# Patient Record
Sex: MALE | Race: WHITE | NOT HISPANIC OR LATINO | Employment: FULL TIME | URBAN - METROPOLITAN AREA
[De-identification: names, ages, dates, MRNs, and addresses within clinical notes are randomized per-mention and may not be internally consistent; named-entity substitution may affect disease eponyms.]

---

## 2017-01-13 ENCOUNTER — TELEPHONE (OUTPATIENT)
Dept: FAMILY MEDICINE CLINIC | Facility: CLINIC | Age: 47
End: 2017-01-13

## 2017-01-13 DIAGNOSIS — I10 ESSENTIAL HYPERTENSION: ICD-10-CM

## 2017-01-13 RX ORDER — LISINOPRIL 10 MG/1
10 TABLET ORAL DAILY
Qty: 90 TABLET | Refills: 0 | Status: SHIPPED | OUTPATIENT
Start: 2017-01-13 | End: 2018-03-09

## 2017-01-13 NOTE — TELEPHONE ENCOUNTER
----- Message from Viri Lomas sent at 1/13/2017  1:35 PM EST -----  Contact: LARA  PATIENT IS OUT OF HIS MEDICATION, ASKING FOR A REFILL:    LISINOPRIL 10 MG    [facility]

## 2018-01-26 ENCOUNTER — TELEPHONE (OUTPATIENT)
Dept: FAMILY MEDICINE CLINIC | Facility: CLINIC | Age: 48
End: 2018-01-26

## 2018-01-26 RX ORDER — FAMCICLOVIR 500 MG/1
500 TABLET ORAL DAILY
Qty: 30 TABLET | Refills: 0 | Status: SHIPPED | OUTPATIENT
Start: 2018-01-26 | End: 2018-03-09 | Stop reason: SDUPTHER

## 2018-01-26 NOTE — TELEPHONE ENCOUNTER
----- Message from Viri Sawant sent at 1/26/2018  2:26 PM EST -----  Contact: JEREMYPT CALLED  REFILL ON famciclovir (FAMVIR) 500 MG tablet    [facility]    [phone]  MESSAGE OK

## 2018-01-26 NOTE — TELEPHONE ENCOUNTER
Please call.  I sent in a 30 day supply and he is due for office visit.  We have not seen him since July 2016.  Please make appointment.

## 2018-03-09 ENCOUNTER — OFFICE VISIT (OUTPATIENT)
Dept: FAMILY MEDICINE CLINIC | Facility: CLINIC | Age: 48
End: 2018-03-09

## 2018-03-09 VITALS
BODY MASS INDEX: 31.56 KG/M2 | SYSTOLIC BLOOD PRESSURE: 130 MMHG | DIASTOLIC BLOOD PRESSURE: 80 MMHG | TEMPERATURE: 99 F | WEIGHT: 233 LBS | HEIGHT: 72 IN | RESPIRATION RATE: 20 BRPM | HEART RATE: 92 BPM

## 2018-03-09 DIAGNOSIS — R05.9 COUGH: Primary | ICD-10-CM

## 2018-03-09 DIAGNOSIS — J20.8 ACUTE BRONCHITIS DUE TO OTHER SPECIFIED ORGANISMS: ICD-10-CM

## 2018-03-09 DIAGNOSIS — B00.9 HERPES SIMPLEX: ICD-10-CM

## 2018-03-09 LAB
EXPIRATION DATE: NORMAL
FLUAV AG NPH QL: NORMAL
FLUBV AG NPH QL: NORMAL
INTERNAL CONTROL: NORMAL
Lab: NORMAL

## 2018-03-09 PROCEDURE — 99213 OFFICE O/P EST LOW 20 MIN: CPT | Performed by: FAMILY MEDICINE

## 2018-03-09 PROCEDURE — 87804 INFLUENZA ASSAY W/OPTIC: CPT | Performed by: FAMILY MEDICINE

## 2018-03-09 RX ORDER — FAMCICLOVIR 500 MG/1
500 TABLET ORAL DAILY
Qty: 30 TABLET | Refills: 1 | Status: SHIPPED | OUTPATIENT
Start: 2018-03-09 | End: 2019-08-19 | Stop reason: SINTOL

## 2018-03-09 RX ORDER — AZITHROMYCIN 250 MG/1
TABLET, FILM COATED ORAL
Qty: 6 TABLET | Refills: 0 | Status: SHIPPED | OUTPATIENT
Start: 2018-03-09 | End: 2019-08-19

## 2018-03-09 RX ORDER — DEXTROMETHORPHAN HYDROBROMIDE AND PROMETHAZINE HYDROCHLORIDE 15; 6.25 MG/5ML; MG/5ML
5 SYRUP ORAL 4 TIMES DAILY PRN
Qty: 180 ML | Refills: 0 | Status: SHIPPED | OUTPATIENT
Start: 2018-03-09 | End: 2019-08-19

## 2018-03-09 NOTE — PATIENT INSTRUCTIONS
Go to the nearest ER or return to clinic if symptoms worsen, fever/chill develop      Acute Bronchitis, Adult  Acute bronchitis is when air tubes (bronchi) in the lungs suddenly get swollen. The condition can make it hard to breathe. It can also cause these symptoms:  · A cough.  · Coughing up clear, yellow, or green mucus.  · Wheezing.  · Chest congestion.  · Shortness of breath.  · A fever.  · Body aches.  · Chills.  · A sore throat.  Follow these instructions at home:  Medicines   · Take over-the-counter and prescription medicines only as told by your doctor.  · If you were prescribed an antibiotic medicine, take it as told by your doctor. Do not stop taking the antibiotic even if you start to feel better.  General instructions   · Rest.  · Drink enough fluids to keep your pee (urine) clear or pale yellow.  · Avoid smoking and secondhand smoke. If you smoke and you need help quitting, ask your doctor. Quitting will help your lungs heal faster.  · Use an inhaler, cool mist vaporizer, or humidifier as told by your doctor.  · Keep all follow-up visits as told by your doctor. This is important.  How is this prevented?  To lower your risk of getting this condition again:  · Wash your hands often with soap and water. If you cannot use soap and water, use hand .  · Avoid contact with people who have cold symptoms.  · Try not to touch your hands to your mouth, nose, or eyes.  · Make sure to get the flu shot every year.  Contact a doctor if:  · Your symptoms do not get better in 2 weeks.  Get help right away if:  · You cough up blood.  · You have chest pain.  · You have very bad shortness of breath.  · You become dehydrated.  · You faint (pass out) or keep feeling like you are going to pass out.  · You keep throwing up (vomiting).  · You have a very bad headache.  · Your fever or chills gets worse.  This information is not intended to replace advice given to you by your health care provider. Make sure you discuss  any questions you have with your health care provider.  Document Released: 06/05/2009 Document Revised: 07/26/2017 Document Reviewed: 06/07/2017  [employer] Interactive Patient Education © 2017 [employer]

## 2018-03-09 NOTE — PROGRESS NOTES
Subjective   Marquise Hyman is a 47 y.o. male.     URI    This is a new problem. The current episode started in the past 7 days. The problem has been gradually worsening. Maximum temperature: 99. The fever has been present for less than 1 day. Associated symptoms include congestion, coughing (productive), headaches and a sore throat. Pertinent negatives include no abdominal pain, diarrhea, dysuria, ear pain, nausea, plugged ear sensation, rhinorrhea, sinus pain, vomiting or wheezing. Treatments tried: Mucinex, Nyquil. The treatment provided no relief.        The following portions of the patient's history were reviewed and updated as appropriate: allergies, current medications, past family history, past medical history, past social history, past surgical history and problem list.    Review of Systems   Constitutional: Positive for fever. Negative for chills.   HENT: Positive for congestion and sore throat. Negative for ear pain, rhinorrhea and sinus pain.    Respiratory: Positive for cough (productive). Negative for shortness of breath and wheezing.    Gastrointestinal: Negative for abdominal pain, diarrhea, nausea and vomiting.   Genitourinary: Negative for dysuria.   Musculoskeletal: Negative for myalgias.   Neurological: Positive for headaches. Negative for dizziness and light-headedness.       Objective   Physical Exam   Constitutional: He is oriented to person, place, and time. He appears well-developed and well-nourished.   HENT:   Head: Normocephalic and atraumatic.   Right Ear: Hearing, tympanic membrane, external ear and ear canal normal.   Left Ear: Hearing, tympanic membrane, external ear and ear canal normal.   Nose: Nose normal.   Mouth/Throat: Oropharynx is clear and moist and mucous membranes are normal.   Eyes: Conjunctivae and EOM are normal.   Neck: Normal range of motion. Neck supple.   Cardiovascular: Normal rate, regular rhythm and normal heart sounds.    Pulmonary/Chest: Effort normal and  breath sounds normal. He has no wheezes.   Dry cough   Musculoskeletal: He exhibits no deformity.   Lymphadenopathy:     He has no cervical adenopathy.   Neurological: He is alert and oriented to person, place, and time.   Skin: Skin is warm and dry.   Psychiatric: His behavior is normal.   Nursing note and vitals reviewed.      Assessment/Plan   Problems Addressed this Visit     None      Visit Diagnoses     Cough    -  Primary    Relevant Medications    promethazine-dextromethorphan (PROMETHAZINE-DM) 6.25-15 MG/5ML syrup    Other Relevant Orders    POC Influenza A / B    Acute bronchitis due to other specified organisms        Relevant Medications    promethazine-dextromethorphan (PROMETHAZINE-DM) 6.25-15 MG/5ML syrup    azithromycin (ZITHROMAX) 250 MG tablet    Herpes simplex        Relevant Medications    famciclovir (FAMVIR) 500 MG tablet          Influenza negative   Cough syrup and Zpak to treat acute bronchitis. Ok to continue OTC Mucinex as directed.     Famvir refilled as courtesy to patient.

## 2019-08-19 ENCOUNTER — OFFICE VISIT (OUTPATIENT)
Dept: FAMILY MEDICINE CLINIC | Facility: CLINIC | Age: 49
End: 2019-08-19

## 2019-08-19 VITALS
SYSTOLIC BLOOD PRESSURE: 144 MMHG | BODY MASS INDEX: 31.08 KG/M2 | WEIGHT: 229.5 LBS | OXYGEN SATURATION: 97 % | TEMPERATURE: 98.3 F | RESPIRATION RATE: 18 BRPM | DIASTOLIC BLOOD PRESSURE: 84 MMHG | HEART RATE: 86 BPM | HEIGHT: 72 IN

## 2019-08-19 DIAGNOSIS — R42 DIZZINESS: ICD-10-CM

## 2019-08-19 DIAGNOSIS — G44.89 OTHER HEADACHE SYNDROME: Primary | ICD-10-CM

## 2019-08-19 DIAGNOSIS — I10 ESSENTIAL HYPERTENSION: ICD-10-CM

## 2019-08-19 PROCEDURE — 99214 OFFICE O/P EST MOD 30 MIN: CPT | Performed by: FAMILY MEDICINE

## 2019-08-19 RX ORDER — MECLIZINE HYDROCHLORIDE 25 MG/1
TABLET ORAL
Refills: 0 | COMMUNITY
Start: 2019-08-15

## 2019-08-19 RX ORDER — VALACYCLOVIR HYDROCHLORIDE 1 G/1
TABLET, FILM COATED ORAL
COMMUNITY
Start: 2019-04-12

## 2019-08-19 RX ORDER — FINASTERIDE 1 MG/1
1 TABLET, FILM COATED ORAL DAILY
Refills: 1 | COMMUNITY
Start: 2019-07-09

## 2019-08-19 RX ORDER — PREDNISONE 20 MG/1
TABLET ORAL
Refills: 0 | COMMUNITY
Start: 2019-08-15

## 2019-08-19 RX ORDER — LISINOPRIL 10 MG/1
10 TABLET ORAL DAILY
Refills: 1 | COMMUNITY
Start: 2019-06-25

## 2019-08-19 RX ORDER — FLUTICASONE PROPIONATE 50 MCG
SPRAY, SUSPENSION (ML) NASAL
Refills: 0 | COMMUNITY
Start: 2019-07-09

## 2019-08-19 NOTE — PROGRESS NOTES
Assessment/Plan       Problems Addressed this Visit        Cardiovascular and Mediastinum    Essential hypertension    Relevant Medications    lisinopril (PRINIVIL,ZESTRIL) 10 MG tablet      Other Visit Diagnoses     Other headache syndrome    -  Primary    Relevant Orders    MRI Brain Without Contrast    Dizziness        Relevant Orders    MRI Brain Without Contrast            Follow up: No Follow-up on file.     DISCUSSION  Persistent headache with dizziness.  Recommend check MRI of the brain.  He has a CT scan of the sinuses ordered today by physician from [facility].    Due to the dizziness, not able to work.  Hopefully can return to work on Wednesday, August 21, 2019.  Has been off since August 13, 2019.    Unable to work due to headache and intermittent dizziness.     Hypertension is stable on current dose of lisinopril 10 mg daily.      MEDICATIONS PRESCRIBED  Requested Prescriptions      No prescriptions requested or ordered in this encounter              -------------------------------------------    Subjective     Chief Complaint   Patient presents with   • Dizziness     ENT x6-8months ago. not sure if related   • Headache     8-9days         Dizziness   This is a new (monday evening til saturday. ) problem. The current episode started in the past 7 days. The problem occurs constantly. The problem has been gradually improving (with meds). Associated symptoms include headaches and nausea (with severe pain). Pertinent negatives include no vomiting or weakness. Associated symptoms comments: Was ok if still but worse when movement. Loss of balance. Dizziness would last 10-15 min. Pressure in ears. No sinus pain. . Exacerbated by: movement.   Headache    This is a recurrent (does not wake up every am with headache. mild headache now) problem. Episode onset: x 1 year. The problem occurs daily. Pain location: temple, across head to behind ears and to neck. The pain quality is not similar to prior headaches (has had  for 1 year off and on). The quality of the pain is described as aching. The pain is mild. Associated symptoms include blurred vision (at times  , comes and goes), dizziness, ear pain (pressure in the ears , ears pop some), a loss of balance, nausea (with severe pain) and tinnitus (some and sporadic). Pertinent negatives include no hearing loss, sinus pressure, vomiting or weakness. Exacerbated by: worse if shakes head. He has tried nothing for the symptoms. The treatment provided no relief. His past medical history is significant for migraine headaches (in the past and this is different).   Hypertension   This is a chronic problem. The current episode started more than 1 year ago. The problem is unchanged. The problem is controlled. Associated symptoms include blurred vision (at times  , comes and goes) and headaches. Pertinent negatives include no peripheral edema or shortness of breath. There are no associated agents to hypertension. Current antihypertension treatment includes ACE inhibitors. The current treatment provides moderate improvement. There are no compliance problems.  There is no history of angina, kidney disease or CAD/MI. There is no history of chronic renal disease.       Had episode of near syncope, was driving, 1 year ago.   Had headache at the time, no heart racing or palpitations        Persistent headaches  Has had for 1 week  2-3/10   No change , not better or worse    Was not able to work last week and saw Dr Isaacs 8/15 and gave steroid shot and prednisone.   Med has helped  Not as dizzy today but still has some when standing up or lighting changes,     Saw allergist and on flonase and meds and has helped with the sinus    Scheduled for CT scan at [facility] at 3:15 pm today    Has to apply for short term disability  Missed 8/13- 8/20/2019        Social History     Tobacco Use   Smoking Status Never Smoker   Smokeless Tobacco Never Used        Past Medical History,Medications, Allergies, and  "social history was reviewed.      Review of Systems   HENT: Positive for ear pain (pressure in the ears , ears pop some) and tinnitus (some and sporadic). Negative for hearing loss and sinus pressure.    Eyes: Positive for blurred vision (at times  , comes and goes).   Respiratory: Negative for shortness of breath.    Gastrointestinal: Positive for nausea (with severe pain). Negative for vomiting.   Neurological: Positive for dizziness and loss of balance. Negative for weakness.       Objective     Vitals:    08/19/19 1230   BP: 144/84   Pulse: 86   Resp: 18   Temp: 98.3 °F (36.8 °C)   TempSrc: Temporal   SpO2: 97%   Weight: 104 kg (229 lb 8 oz)   Height: 182.9 cm (72\")          Physical Exam   Constitutional: Vital signs are normal. He appears well-developed and well-nourished.   HENT:   Head: Normocephalic and atraumatic.   Right Ear: Hearing, tympanic membrane, external ear and ear canal normal.   Left Ear: Hearing, tympanic membrane, external ear and ear canal normal.   Mouth/Throat: Oropharynx is clear and moist.   Eyes: Conjunctivae, EOM and lids are normal. Pupils are equal, round, and reactive to light.   Neck: Normal range of motion. Neck supple. No thyromegaly present.   Cardiovascular: Normal rate, regular rhythm and normal heart sounds. Exam reveals no friction rub.   No murmur heard.  Pulmonary/Chest: Effort normal and breath sounds normal. No respiratory distress. He has no wheezes. He has no rales.   Abdominal: Soft. Normal appearance and bowel sounds are normal. He exhibits no distension and no mass. There is no tenderness. There is no rebound and no guarding.   Musculoskeletal: He exhibits no edema.   Neurological: He is alert. He has normal strength. He displays no atrophy and no tremor. No cranial nerve deficit. He exhibits normal muscle tone. He displays a negative Romberg sign. Coordination normal.   Reflex Scores:       Bicep reflexes are 2+ on the right side and 2+ on the left side.  Mild " swaying with Romberg   Skin: Skin is warm and dry.   Psychiatric: He has a normal mood and affect. His speech is normal. Cognition and memory are normal.   Nursing note and vitals reviewed.                Abilio Baron MD

## 2019-08-26 ENCOUNTER — HOSPITAL ENCOUNTER (OUTPATIENT)
Dept: MRI IMAGING | Facility: HOSPITAL | Age: 49
Discharge: HOME | End: 2019-08-26
Admitting: FAMILY MEDICINE

## 2019-08-26 DIAGNOSIS — G44.89 OTHER HEADACHE SYNDROME: ICD-10-CM

## 2019-08-26 DIAGNOSIS — R42 DIZZINESS: ICD-10-CM

## 2019-08-26 PROCEDURE — 70551 MRI BRAIN STEM W/O DYE: CPT

## 2019-08-29 DIAGNOSIS — R90.89 MRI OF BRAIN ABNORMAL: ICD-10-CM

## 2019-08-29 DIAGNOSIS — Q28.3 BRAIN VASCULAR MALFORMATION: Primary | ICD-10-CM

## 2019-09-24 ENCOUNTER — OFFICE VISIT (OUTPATIENT)
Dept: NEUROSURGERY | Facility: CLINIC | Age: 49
End: 2019-09-24

## 2019-09-24 VITALS
SYSTOLIC BLOOD PRESSURE: 120 MMHG | DIASTOLIC BLOOD PRESSURE: 86 MMHG | TEMPERATURE: 97.6 F | WEIGHT: 231.6 LBS | BODY MASS INDEX: 31.37 KG/M2 | HEIGHT: 72 IN

## 2019-09-24 DIAGNOSIS — R42 DIZZINESS: ICD-10-CM

## 2019-09-24 DIAGNOSIS — G44.209 MUSCLE CONTRACTION HEADACHE: Primary | ICD-10-CM

## 2019-09-24 PROCEDURE — 99203 OFFICE O/P NEW LOW 30 MIN: CPT | Performed by: NEUROLOGICAL SURGERY

## 2019-09-24 NOTE — PROGRESS NOTES
"Marquise Hyman  1970  [id number]      Chief Complaint   Patient presents with   • Headache   • Dizziness   • Blurred Vision       HISTORY OF PRESENT ILLNESS: This is a 49-year-old who presents with approximately 2-year history of increasing episodes which she describes as headaches, dizziness, pain and pressure in his ears and blurred vision.  Any movement tends to exacerbate his symptoms.  He has no other symptoms associated with this in the context of unilateral weakness or sensory loss.  MRI of the brain was performed is referred for neurosurgical consultation.  He has noted that he has some \"tightness\" in his neck.    Past Medical History:   Diagnosis Date   • Herpes simplex    • Lumbar strain        Past Surgical History:   Procedure Laterality Date   • HERNIA REPAIR         Family History   Problem Relation Age of Onset   • Heart disease Mother         congenital   • Diabetes Mother    • Heart disease Father         congenital   • Diabetes Father        Social History     Socioeconomic History   • Marital status: Legally      Spouse name: Not on file   • Number of children: Not on file   • Years of education: Not on file   • Highest education level: Not on file   Tobacco Use   • Smoking status: Never Smoker   • Smokeless tobacco: Never Used   Substance and Sexual Activity   • Alcohol use: Yes     Comment: occasional   • Drug use: No   • Sexual activity: Defer       No Known Allergies      Current Outpatient Medications:   •  finasteride (PROPECIA) 1 MG tablet, Take 1 mg by mouth Daily., Disp: , Rfl: 1  •  fluticasone (FLONASE) 50 MCG/ACT nasal spray, USE 1 SPRAY(S) IN EACH NOSTRIL TWICE DAILY, Disp: , Rfl: 0  •  lisinopril (PRINIVIL,ZESTRIL) 10 MG tablet, Take 10 mg by mouth Daily., Disp: , Rfl: 1  •  meclizine (ANTIVERT) 25 MG tablet, TAKE 1 TABLET BY MOUTH THREE TIMES DAILY IF NEEDED FOR DIZZINESS FOR UP TO 10 DAYS, Disp: , Rfl: 0  •  predniSONE (DELTASONE) 20 MG tablet, TAKE 3 TABLETS BY " "MOUTH ONCE DAILY FOR 3 DAYS THEN TAKE 2 TABS ONCE DAILY FOR 3 DAYS THEN TAKE 1 TAB ONCE DAILY FOR 3 DAYS, Disp: , Rfl: 0  •  valACYclovir (VALTREX) 1000 MG tablet, Take 2 pills at first sign of fever blister. Repeat in 12 hours. Use remainder for future outbreaks., Disp: , Rfl:     Review of Systems   Constitutional: Positive for fatigue.   HENT: Positive for dental problem (occassional ), ear pain, hearing loss, tinnitus and trouble swallowing (occasional).    Eyes: Positive for photophobia and visual disturbance.   Genitourinary: Positive for urgency.   Musculoskeletal: Positive for arthralgias, back pain, myalgias, neck pain and neck stiffness.   Allergic/Immunologic: Positive for environmental allergies.   Neurological: Positive for dizziness, syncope (occassional-nearly fainting), speech difficulty (occassional), weakness, light-headedness and headaches.   Psychiatric/Behavioral: Positive for agitation, decreased concentration and sleep disturbance. The patient is nervous/anxious.        Vitals:    09/24/19 1536   BP: 120/86   BP Location: Left arm   Patient Position: Sitting   Cuff Size: Adult   Temp: 97.6 °F (36.4 °C)   TempSrc: Temporal   Weight: 105 kg (231 lb 9.6 oz)   Height: 182.9 cm (72\")       Neurological Examination:    Mental status/speech: The patient is alert and oriented.  Speech is clear without aphysia or dysarthria.  No overt cognitive deficits.    Cranial nerve examination:    Olfaction: Smell is intact.  Vision: Vision is intact without visual field abnormalities.  Funduscopic examination is normal.  No pupillary irregularity.  Ocular motor examination: The extraocular muscles are intact.  There is no diplopia.  The pupil is round and reactive to both light and accommodation.  There is no nystagmus.  Facial movement/sensation: There is no facial weakness.  Sensation is intact in the first, second, and third divisions of the trigeminal nerve.  The corneal reflex is intact.  Auditory: Hearing " is intact to finger rub bilaterally.  Cranial nerves IX, X, XI, XII: Phonation is normal.  No dysphagia.  Tongue is protruded in the midline without atrophy.  The gag reflex is intact.  Shoulder shrug is normal.    Musculoligamentous ligamentous examination: Slight decreased range of motion of cervical spine.  No weakness, sensory loss or reflex asymmetry.  His gait is normal.  No Babinski James or clonus.          Medical Decision Making:     Diagnostic Data Set: MRI of the brain is essentially within normal limits.      Assessment: Muscle contraction headache          Recommendations: I have ordered a cervical spine MRI, EEG and will ask him to return to see me on the same day.  The etiology of headaches remains somewhat obscure an undisclosed.  These headaches may well be related to stress as well.        I greatly appreciate the opportunity to see and evaluate this individual.  If you have questions or concerns regarding issues that I may have overlooked please call me at any time: [phone].  Giuseppe Castaneda M.D.  [facility]  [address] [place], [place] [address] .  [place] [phone]

## 2019-10-01 DIAGNOSIS — R42 DIZZINESS: Primary | ICD-10-CM

## 2019-10-01 DIAGNOSIS — G44.209 MUSCLE CONTRACTION HEADACHE: ICD-10-CM

## 2019-10-03 ENCOUNTER — APPOINTMENT (OUTPATIENT)
Dept: MRI IMAGING | Facility: HOSPITAL | Age: 49
End: 2019-10-03

## 2019-10-07 ENCOUNTER — HOSPITAL ENCOUNTER (OUTPATIENT)
Dept: PHYSICAL THERAPY | Facility: HOSPITAL | Age: 49
Setting detail: THERAPIES SERIES
Discharge: HOME | End: 2019-10-07

## 2019-10-07 ENCOUNTER — HOSPITAL ENCOUNTER (OUTPATIENT)
Dept: NEUROLOGY | Facility: HOSPITAL | Age: 49
Discharge: HOME | End: 2019-10-07
Admitting: NEUROLOGICAL SURGERY

## 2019-10-07 DIAGNOSIS — R51.9 CHRONIC NONINTRACTABLE HEADACHE, UNSPECIFIED HEADACHE TYPE: ICD-10-CM

## 2019-10-07 DIAGNOSIS — G44.209 MUSCLE CONTRACTION HEADACHE: ICD-10-CM

## 2019-10-07 DIAGNOSIS — R42 DIZZINESS: Primary | ICD-10-CM

## 2019-10-07 DIAGNOSIS — R42 DIZZINESS: ICD-10-CM

## 2019-10-07 DIAGNOSIS — G89.29 CHRONIC NONINTRACTABLE HEADACHE, UNSPECIFIED HEADACHE TYPE: ICD-10-CM

## 2019-10-07 PROCEDURE — 97161 PT EVAL LOW COMPLEX 20 MIN: CPT

## 2019-10-07 PROCEDURE — 95819 EEG AWAKE AND ASLEEP: CPT

## 2019-10-07 NOTE — THERAPY DISCHARGE NOTE
Outpatient Physical Therapy Vestibular Initial Evaluation/Discharge  [facility]     Patient Name: Marquise Hyman  : 1970  MRN: [id number]  Today's Date: 10/7/2019      Visit Date: 10/07/2019    Patient Active Problem List   Diagnosis   • Essential hypertension   • Fatigue   • Vitamin D deficiency   • Health care maintenance     Past Medical History:   Diagnosis Date   • Herpes simplex    • Lumbar strain      Past Surgical History:   Procedure Laterality Date   • HERNIA REPAIR     Visit Dx:     ICD-10-CM ICD-9-CM   1. Dizziness R42 780.4   2. Chronic nonintractable headache, unspecified headache type R51 784.0     Patient History     Row Name 10/07/19 0730             History    Chief Complaint  Difficulty with daily activities;Headache;Dizziness  -MM      Date Current Problem(s) Began  19  -MM      Brief Description of Current Complaint  Client presents with constant headache and occaisional dizziness with fast head movement. He had a severe bout of vertigo 19 and symptoms lasted 4-5 days. He only notices dizziness now if he makes sudden movements or bends over a lot. He had some dizziness a few days ago when bending over and playing pool. His headache has been constant and usually mild. Symptoms improve some with over the counter ibuprofen, but don't every go away completely. He reports a frequent sensation of pressure behind his ears. He reports that his ears also pop a lot. No changes in hearing and no tinnitus.  -MM      Patient/Caregiver Goals  Relieve pain;Relief from dizziness  -MM         Pain     Pain Location  Head  -MM      Pain at Present  2  -MM      Pain at Best  1  -MM      Pain at Worst  4  -MM      Pain Frequency  Constant/continuous  -MM      Pain Description  Aching  -MM      Pain Comments  headache posterior to the ears and around to the jaw bilateral.   -MM      Difficulties with ADL's?  sudden movements and forward bending  -MM         Fall Risk Assessment    Any  falls in the past year:  No  -MM         Daily Activities    Primary Language  English  -MM      Barriers to learning  None  -MM      Pt Participated in POC and Goals  Yes  -MM        User Key  (r) = Recorded By, (t) = Taken By, (c) = Cosigned By    Initials Name Provider Type    Per Geronimo PT Physical Therapist        Vestibular Trung     Row Name 10/07/19 0815             Pain Assessment    Pain Assessment  -- mild constant headache  -MM         Vestibular Objective    Fall History  no  -MM      Use of Assistive Devices  none  -MM         Symptom Behavior    Type of Dizziness  Blurred Vision;Other occaisional dizziness with fast head movement/bending  -MM      Frequency of Dizziness  Weekly  -MM      Duration of Dizziness  Seconds  -MM      VAS Intensity (0-10)  3  -MM      Aggravating Factors  Forward bending;Turning head quickly  -MM      Relieving Factors  Rest;Slow movements;Medication  -MM         Occulomotor Exam Fixation Present    Occular ROM  Normal  -MM      Spontaneous Nystagmus  Absent  -MM      Gaze-induced Nystagmus  Absent  -MM      Smooth Pursuit  Normal  -MM      Saccades  Intact  -MM         Vestibulo-Occular Reflex (VOR)    VOR to Fast Head Movement/Head Thrust Test  Normal  -MM         Positional Testing    Olesya-Hallpike Right  No nystagmus  -MM      Olesya-Hallpike Left  No nystagmus  -MM      Horizontal Roll Test Right  No nystagmus  -MM      Horizontal Roll Test Left  No nystagmus  -MM         General ROM    GENERAL ROM COMMENTS  neck ROM is normal. No increase or change in pain or dizziness with prolonged neck extension or rotation either direction.  -MM         High-level Balance    High-level Balance Other  balance on foam EC: normal after 2 practice sets. standing turns: normal. walking and locating targets: normal.   -MM        User Key  (r) = Recorded By, (t) = Taken By, (c) = Cosigned By    Initials Name Provider Type    Per Geronimo PT Physical Therapist        PT OP  Goals     Row Name 10/07/19 0815          PT Short Term Goals    STG Date to Achieve  10/14/19  -MM     STG 1  Client will demonstrate good understanding of testing for vestibular dysfunction.  -MM     STG 1 Progress  Met  -MM        Time Calculation    PT Goal Re-Cert Due Date  01/05/20  -MM       User Key  (r) = Recorded By, (t) = Taken By, (c) = Cosigned By    Initials Name Provider Type    Per Geronimo, PT Physical Therapist        PT Assessment/Plan     Row Name 10/07/19 0815          PT Assessment    Functional Limitations  Limitation in home management  -MM     Impairments  Pain  -MM     Assessment Comments  Client was evaluated today for headaches and dizziness. Symptoms suggest possible vestibular component, but extensive testing for vestibular system was all normal. He demonstrates normal balance and coordination. He has very good neck ROM without increase in pain or dizziness. He has a constant headache with pressure sensation. The cause of the headache is uncertain. No further PT is warranted at this time. There isn't any indication that vestibular rehab or cervical spine rehab would be of benefit at this time.  -MM     Please refer to paper survey for additional self-reported information  Yes  -MM     Rehab Potential  Fair  -MM     Patient/caregiver participated in establishment of treatment plan and goals  Yes  -MM     Patient would benefit from skilled therapy intervention  Yes  -MM        PT Plan    PT Frequency  One time visit  -MM     Planned CPT's?  PT EVAL LOW COMPLEXITY: 66410  -MM     PT Plan Comments  No further PT indicated at this time. Client is following up with his physician.  -MM       User Key  (r) = Recorded By, (t) = Taken By, (c) = Cosigned By    Initials Name Provider Type    Per Geronimo, PT Physical Therapist       Time Calculation:   Start Time: 0815   Therapy Charges for Today     Code Description Service Date Service Provider Modifiers Qty    [id number]  PT  EVAL LOW COMPLEXITY 3 10/7/2019 Per Zamarripa, PT GP 1      Per Zamarripa, PT  10/7/2019

## 2019-10-14 ENCOUNTER — APPOINTMENT (OUTPATIENT)
Dept: MRI IMAGING | Facility: HOSPITAL | Age: 49
End: 2019-10-14

## 2019-10-22 ENCOUNTER — OFFICE VISIT (OUTPATIENT)
Dept: NEUROSURGERY | Facility: CLINIC | Age: 49
End: 2019-10-22

## 2019-10-22 ENCOUNTER — HOSPITAL ENCOUNTER (OUTPATIENT)
Dept: MRI IMAGING | Facility: HOSPITAL | Age: 49
Discharge: HOME | End: 2019-10-22
Admitting: NEUROLOGICAL SURGERY

## 2019-10-22 VITALS
WEIGHT: 225 LBS | TEMPERATURE: 97.3 F | HEIGHT: 72 IN | SYSTOLIC BLOOD PRESSURE: 118 MMHG | BODY MASS INDEX: 30.48 KG/M2 | DIASTOLIC BLOOD PRESSURE: 76 MMHG

## 2019-10-22 DIAGNOSIS — R42 DIZZINESS: ICD-10-CM

## 2019-10-22 DIAGNOSIS — G44.209 MUSCLE CONTRACTION HEADACHE: ICD-10-CM

## 2019-10-22 DIAGNOSIS — R42 DIZZINESS: Primary | ICD-10-CM

## 2019-10-22 PROCEDURE — 99213 OFFICE O/P EST LOW 20 MIN: CPT | Performed by: NEUROLOGICAL SURGERY

## 2019-10-22 PROCEDURE — 72141 MRI NECK SPINE W/O DYE: CPT

## 2019-10-22 RX ORDER — METHOCARBAMOL 750 MG/1
750 TABLET, FILM COATED ORAL NIGHTLY
Qty: 14 TABLET | Refills: 0 | Status: SHIPPED | OUTPATIENT
Start: 2019-10-22 | End: 2019-11-14 | Stop reason: SDUPTHER

## 2019-10-22 RX ORDER — CELECOXIB 200 MG/1
200 CAPSULE ORAL 2 TIMES DAILY
Qty: 28 CAPSULE | Refills: 0 | Status: SHIPPED | OUTPATIENT
Start: 2019-10-22 | End: 2019-11-14 | Stop reason: SDUPTHER

## 2019-10-22 NOTE — PROGRESS NOTES
Marquise Hyman  1970  [id number]                        CHIEF COMPLAINT: Neck pain and headache         MEDICAL HISTORY SINCE LAST ENCOUNTER: He continues to have headaches and neck pain for which I have been unable to find a precise etiology.  His vertigo has not been helped with physical therapy.  He noted that when he carried a package about his neck he has headaches and neck pain were worse.  He reports today for follow-up studies.           Past Medical History:   Diagnosis Date   • Herpes simplex    • Lumbar strain               Past Surgical History:   Procedure Laterality Date   • HERNIA REPAIR                Family History   Problem Relation Age of Onset   • Heart disease Mother         congenital   • Diabetes Mother    • Heart disease Father         congenital   • Diabetes Father               Social History     Socioeconomic History   • Marital status: Legally      Spouse name: Not on file   • Number of children: Not on file   • Years of education: Not on file   • Highest education level: Not on file   Tobacco Use   • Smoking status: Never Smoker   • Smokeless tobacco: Never Used   Substance and Sexual Activity   • Alcohol use: Yes     Comment: occasional   • Drug use: No   • Sexual activity: Defer            No Known Allergies           Current Outpatient Medications:   •  finasteride (PROPECIA) 1 MG tablet, Take 1 mg by mouth Daily., Disp: , Rfl: 1  •  fluticasone (FLONASE) 50 MCG/ACT nasal spray, USE 1 SPRAY(S) IN EACH NOSTRIL TWICE DAILY, Disp: , Rfl: 0  •  lisinopril (PRINIVIL,ZESTRIL) 10 MG tablet, Take 10 mg by mouth Daily., Disp: , Rfl: 1  •  meclizine (ANTIVERT) 25 MG tablet, TAKE 1 TABLET BY MOUTH THREE TIMES DAILY IF NEEDED FOR DIZZINESS FOR UP TO 10 DAYS, Disp: , Rfl: 0  •  predniSONE (DELTASONE) 20 MG tablet, TAKE 3 TABLETS BY MOUTH ONCE DAILY FOR 3 DAYS THEN TAKE 2 TABS ONCE DAILY FOR 3 DAYS THEN TAKE 1 TAB ONCE DAILY FOR 3 DAYS, Disp: , Rfl: 0  •  valACYclovir (VALTREX)  1000 MG tablet, Take 2 pills at first sign of fever blister. Repeat in 12 hours. Use remainder for future outbreaks., Disp: , Rfl:          Review of Systems   Constitutional: Positive for fatigue. Negative for activity change, appetite change, chills, diaphoresis, fever and unexpected weight change.   HENT: Positive for dental problem (occassional ), ear pain, hearing loss, tinnitus and trouble swallowing (occasional). Negative for congestion, drooling, ear discharge, facial swelling, mouth sores, nosebleeds, postnasal drip, rhinorrhea, sinus pressure, sneezing, sore throat and voice change.    Eyes: Positive for photophobia and visual disturbance. Negative for pain, discharge, redness and itching.   Respiratory: Negative for apnea, cough, choking, chest tightness, shortness of breath, wheezing and stridor.    Cardiovascular: Negative for chest pain, palpitations and leg swelling.   Gastrointestinal: Negative for abdominal distention, abdominal pain, anal bleeding, blood in stool, constipation, diarrhea, nausea, rectal pain and vomiting.   Endocrine: Negative for cold intolerance, heat intolerance, polydipsia, polyphagia and polyuria.   Genitourinary: Positive for urgency. Negative for decreased urine volume, difficulty urinating, dysuria, enuresis, flank pain, frequency, genital sores and hematuria.   Musculoskeletal: Positive for arthralgias, back pain, myalgias, neck pain and neck stiffness. Negative for gait problem and joint swelling.   Skin: Negative for color change, pallor, rash and wound.   Allergic/Immunologic: Positive for environmental allergies. Negative for food allergies and immunocompromised state.   Neurological: Positive for dizziness, syncope (occassional-nearly fainting), speech difficulty (occassional), weakness, light-headedness and headaches. Negative for tremors, seizures, facial asymmetry and numbness.   Hematological: Negative for adenopathy. Does not bruise/bleed easily.  "  Psychiatric/Behavioral: Positive for agitation, decreased concentration and sleep disturbance. Negative for behavioral problems, confusion, dysphoric mood, hallucinations, self-injury and suicidal ideas. The patient is nervous/anxious. The patient is not hyperactive.                Vitals:    10/22/19 1410   Weight: 102 kg (225 lb)   Height: 182.9 cm (72\")               EXAMINATION: Slight decreased range of motion of the neck.  No weakness, sensory loss or reflex asymmetry otherwise.  Gait normal.            MEDICAL DECISION MAKING: EEG is normal.  Cervical MRI shows mild degenerative change but no evidence of significant compromise of the neuroforamen or canal.           ASSESSMENT/DISPOSITION: I do not know of anything to offer him from a surgical point of view.  I am unable to find any abnormality that would suggest surgery would be a consideration.  I have given him prescription of Celebrex 200 mg twice daily Robaxin-750 milligrams at night and will ask him to call me in 2 weeks.  If this protocol is not help would recommend neurology evaluation.              I APPRECIATE THE OPPORTUNITY OF THIS REFERRAL. PLEASE CALL IF ANY       QUESTIONS [phone]    "

## 2019-10-22 NOTE — PATIENT INSTRUCTIONS
Call Dr. Castaneda on a Monday or Tuesday with an update.      Ask for Socorro () and leave a message for  Dr. Castaneda.     He will call you back at the end of the day as soon as he can.     [phone]    Call 2 weeks

## 2019-11-14 ENCOUNTER — PATIENT MESSAGE (OUTPATIENT)
Dept: NEUROSURGERY | Facility: CLINIC | Age: 49
End: 2019-11-14

## 2019-11-14 DIAGNOSIS — G44.209 MUSCLE CONTRACTION HEADACHE: Primary | ICD-10-CM

## 2019-11-14 DIAGNOSIS — R51.9 ACUTE INTRACTABLE HEADACHE, UNSPECIFIED HEADACHE TYPE: Primary | ICD-10-CM

## 2019-11-14 RX ORDER — METHOCARBAMOL 750 MG/1
750 TABLET, FILM COATED ORAL NIGHTLY
Qty: 14 TABLET | Refills: 0 | Status: SHIPPED | OUTPATIENT
Start: 2019-11-14 | End: 2019-11-28

## 2019-11-14 RX ORDER — CELECOXIB 200 MG/1
200 CAPSULE ORAL 2 TIMES DAILY
Qty: 28 CAPSULE | Refills: 0 | Status: SHIPPED | OUTPATIENT
Start: 2019-11-14 | End: 2019-11-28

## 2019-11-15 ENCOUNTER — TELEPHONE (OUTPATIENT)
Dept: NEUROSURGERY | Facility: CLINIC | Age: 49
End: 2019-11-15

## 2019-11-15 NOTE — TELEPHONE ENCOUNTER
Regarding: Prescription Question  Contact: [phone]  ----- Message from [employer], Generic sent at 11/14/2019  2:46 PM EST -----    Hemarielos Castaneda,   The meds have really helped my neck pain and tension.    Headaches are very minimal at this point.   Still have some days but not constant like before.   Could I get refills?  Thank you,  Marquise Hyman

## 2019-12-04 RX ORDER — CELECOXIB 200 MG/1
200 CAPSULE ORAL DAILY
Qty: 60 CAPSULE | Refills: 0 | Status: SHIPPED | OUTPATIENT
Start: 2019-12-04 | End: 2020-10-27 | Stop reason: SDUPTHER

## 2019-12-04 NOTE — TELEPHONE ENCOUNTER
Provider:  Leonel  Caller:  Automated refill request  Surgery:  NA  Surgery Date:    Last visit:  10/22/19  Next visit: NA    Reason for call:         Requested Prescriptions     Pending Prescriptions Disp Refills   • celecoxib (CeleBREX) 200 MG capsule 60 capsule 0     Sig: Take 1 capsule by mouth Daily.     Regarding: Prescription Question  Contact: [phone]  ----- Message from [employer] sent at 12/4/2019  5:52 AM EST -----    Can I please get a 30 day or 90 day prescription refill of the Celebrex next?    These 14 day ones are too frequent.

## 2020-01-21 RX ORDER — CELECOXIB 200 MG/1
200 CAPSULE ORAL 2 TIMES DAILY
Qty: 60 CAPSULE | Refills: 0 | Status: SHIPPED | OUTPATIENT
Start: 2020-01-21 | End: 2020-03-28 | Stop reason: SDUPTHER

## 2020-01-21 NOTE — TELEPHONE ENCOUNTER
Provider: Leonel   Caller: Pt (My chart message)  Time of call:   6:49am  Phone #:  [phone]  Surgery:  n/a  Surgery Date: n/a    Last visit: 10/22/19    Next visit: PRN       Reason for call:       Pt requesting rf on Celebrex.

## 2020-01-21 NOTE — TELEPHONE ENCOUNTER
----- Message from Marquise Hyman sent at 1/21/2020  6:49 AM EST -----  Regarding: Prescription Question  Contact: [phone]  Hello.   I just realized my prescription for Celebrex runs out tomorrow.   Could I get a refill?   It seems to be working well with the neck pain and tension, which have reduced the headaches significantly.   Thank you.

## 2020-03-30 RX ORDER — CELECOXIB 200 MG/1
200 CAPSULE ORAL 2 TIMES DAILY
Qty: 60 CAPSULE | Refills: 0 | Status: SHIPPED | OUTPATIENT
Start: 2020-03-30 | End: 2020-06-17 | Stop reason: SDUPTHER

## 2020-03-30 NOTE — TELEPHONE ENCOUNTER
Provider: Leonel   [facility]  Surgery:  n/a  Surgery Date: n/a    Last visit: 10/22/19    Next visit: PRN       Pt requests refill of Celebrex, med pended.

## 2020-06-18 RX ORDER — CELECOXIB 200 MG/1
200 CAPSULE ORAL 2 TIMES DAILY
Qty: 60 CAPSULE | Refills: 0 | Status: SHIPPED | OUTPATIENT
Start: 2020-06-18 | End: 2020-08-16 | Stop reason: SDUPTHER

## 2020-06-18 NOTE — TELEPHONE ENCOUNTER
Provider:  Leonel  Caller:  Automated refill request  Surgery:  NA  Surgery Date:    Last visit:  10/22/20  Next visit: NA    Reason for call:         Requested Prescriptions     Pending Prescriptions Disp Refills   • celecoxib (CeleBREX) 200 MG capsule 60 capsule 0     Sig: Take 1 capsule by mouth 2 (Two) Times a Day.

## 2020-08-17 RX ORDER — CELECOXIB 200 MG/1
200 CAPSULE ORAL 2 TIMES DAILY
Qty: 60 CAPSULE | Refills: 0 | Status: SHIPPED | OUTPATIENT
Start: 2020-08-17

## 2020-08-17 RX ORDER — CELECOXIB 200 MG/1
200 CAPSULE ORAL DAILY
Qty: 60 CAPSULE | Refills: 0 | OUTPATIENT
Start: 2020-08-17

## 2020-10-27 DIAGNOSIS — G44.209 MUSCLE CONTRACTION HEADACHE: Primary | ICD-10-CM

## 2020-10-27 RX ORDER — CELECOXIB 200 MG/1
200 CAPSULE ORAL 2 TIMES DAILY
Qty: 60 CAPSULE | Refills: 0 | OUTPATIENT
Start: 2020-10-27

## 2020-10-27 RX ORDER — CELECOXIB 200 MG/1
200 CAPSULE ORAL DAILY
Qty: 90 CAPSULE | Refills: 3 | OUTPATIENT
Start: 2020-10-27

## 2020-10-27 NOTE — TELEPHONE ENCOUNTER
Provider:  Leonel  Caller: [facility]  Time of call:  9:57   Phone #:  [phone]  Surgery:  no  Surgery Date:    Last visit:   10/22/19  Next visit: None    GREGORIA:         Reason for call:     Patient requests 90 day supply of Celebrex with 3 refills.

## 2020-10-27 NOTE — TELEPHONE ENCOUNTER
Already refused per previous tele encounter. Please refuse medication. Note added to pharmacy that medication needs to be refilled by PCP

## 2020-10-28 RX ORDER — CELECOXIB 200 MG/1
200 CAPSULE ORAL DAILY
Qty: 60 CAPSULE | Refills: 0 | Status: SHIPPED | OUTPATIENT
Start: 2020-10-28 | End: 2020-11-03 | Stop reason: SDUPTHER

## 2020-11-03 RX ORDER — CELECOXIB 200 MG/1
200 CAPSULE ORAL DAILY
Qty: 60 CAPSULE | Refills: 0 | Status: SHIPPED | OUTPATIENT
Start: 2020-11-03

## 2020-11-03 NOTE — TELEPHONE ENCOUNTER
Requested Prescriptions     Pending Prescriptions Disp Refills   • celecoxib (CeleBREX) 200 MG capsule 60 capsule 0     Sig: Take 1 capsule by mouth Daily.   \

## 2024-03-13 ENCOUNTER — OFFICE VISIT (OUTPATIENT)
Dept: FAMILY MEDICINE CLINIC | Facility: CLINIC | Age: 54
End: 2024-03-13
Payer: COMMERCIAL

## 2024-03-13 VITALS
HEART RATE: 85 BPM | HEIGHT: 72 IN | DIASTOLIC BLOOD PRESSURE: 94 MMHG | SYSTOLIC BLOOD PRESSURE: 142 MMHG | BODY MASS INDEX: 32.32 KG/M2 | WEIGHT: 238.6 LBS | RESPIRATION RATE: 14 BRPM | TEMPERATURE: 97.5 F | OXYGEN SATURATION: 97 %

## 2024-03-13 DIAGNOSIS — B34.9 VIRAL INFECTION: ICD-10-CM

## 2024-03-13 DIAGNOSIS — J02.9 SORE THROAT: Primary | ICD-10-CM

## 2024-03-13 DIAGNOSIS — J03.90 ACUTE TONSILLITIS, UNSPECIFIED ETIOLOGY: ICD-10-CM

## 2024-03-13 DIAGNOSIS — B96.89 BACTERIAL SKIN INFECTION: ICD-10-CM

## 2024-03-13 DIAGNOSIS — L08.9 BACTERIAL SKIN INFECTION: ICD-10-CM

## 2024-03-13 LAB
EXPIRATION DATE: NORMAL
FLUAV AG NPH QL: NEGATIVE
FLUBV AG NPH QL: NEGATIVE
INTERNAL CONTROL: NORMAL
Lab: NORMAL
S PYO AG THROAT QL: NEGATIVE
SARS-COV-2 AG UPPER RESP QL IA.RAPID: NOT DETECTED

## 2024-03-13 RX ORDER — VALACYCLOVIR HYDROCHLORIDE 1 G/1
1000 TABLET, FILM COATED ORAL 2 TIMES DAILY
Qty: 14 TABLET | Refills: 0 | Status: SHIPPED | OUTPATIENT
Start: 2024-03-13 | End: 2024-03-20

## 2024-03-13 RX ORDER — CEPHALEXIN 500 MG/1
500 CAPSULE ORAL 2 TIMES DAILY
Qty: 20 CAPSULE | Refills: 0 | Status: SHIPPED | OUTPATIENT
Start: 2024-03-13 | End: 2024-03-23

## 2024-03-13 NOTE — PROGRESS NOTES
Date: 2024   Patient Name: Marquise Hyman  : 1970   MRN: [id number]     Chief Complaint:    Chief Complaint   Patient presents with    Sore Throat     Blisters in nose, on face, in mouth. Started Saturday AM. Has had fever and lethargy.       History of Present Illness: Marquise Hyman is a 53 y.o. male who is here today for Sore throat, fatigue that started on Saturday.  throat pain worsened. Monday he started with blisters over lips and left side nasal blisters are present and fever started at 102. No cough, congestion, headaches, N/V/D, shortness of breath, chest pains. Not taking any OTC medications. No other sick encounters     Sore Throat              Review of Systems:   Review of Systems   HENT:  Positive for sore throat.    Skin:  Positive for skin lesions (upper and lower lips, left side of nose).       Past Medical History:   Past Medical History:   Diagnosis Date    Herpes simplex     Lumbar strain        Past Surgical History:   Past Surgical History:   Procedure Laterality Date    HERNIA REPAIR         Family History:   Family History   Problem Relation Age of Onset    Heart disease Mother         congenital    Diabetes Mother     Heart disease Father         congenital    Diabetes Father        Social History:   Social History     Socioeconomic History    Marital status: Legally    Tobacco Use    Smoking status: Never    Smokeless tobacco: Never   Substance and Sexual Activity    Alcohol use: Yes     Comment: occasional    Drug use: No    Sexual activity: Defer       Medications:     Current Outpatient Medications:     cephalexin (Keflex) 500 MG capsule, Take 1 capsule by mouth 2 (Two) Times a Day for 10 days., Disp: 20 capsule, Rfl: 0    mupirocin (BACTROBAN) 2 % ointment, Apply 1 Application topically to the appropriate area as directed 3 (Three) Times a Day., Disp: 30 g, Rfl: 0    valACYclovir (Valtrex) 1000 MG tablet, Take 1 tablet by mouth 2 (Two) Times  "a Day for 7 days., Disp: 14 tablet, Rfl: 0    Allergies:   No Known Allergies      Physical Exam:  Vital Signs:   Vitals:    03/13/24 1029   BP: 142/94   Pulse: 85   Resp: 14   Temp: 97.5 °F (36.4 °C)   SpO2: 97%   Weight: 108 kg (238 lb 9.6 oz)   Height: 182.9 cm (72\")     Body mass index is 32.36 kg/m².     Physical Exam  Vitals and nursing note reviewed.   Constitutional:       Appearance: He is not ill-appearing.   HENT:      Head: Normocephalic and atraumatic.      Right Ear: External ear normal. No middle ear effusion. Tympanic membrane is not erythematous or bulging.      Left Ear: External ear normal.  No middle ear effusion. Tympanic membrane is not erythematous or bulging.      Nose: Nose normal. No nasal tenderness or congestion.      Right Turbinates: Not enlarged or swollen.      Left Turbinates: Not enlarged or swollen.      Right Sinus: No maxillary sinus tenderness.      Left Sinus: No maxillary sinus tenderness.        Mouth/Throat:      Mouth: Mucous membranes are moist.      Pharynx: Posterior oropharyngeal erythema present. No pharyngeal swelling.      Tonsils: Tonsillar exudate present. 3+ on the right. 3+ on the left.      Comments: Lesions, vesicles present on BL upper and lower lips  Eyes:      Pupils: Pupils are equal, round, and reactive to light.   Cardiovascular:      Rate and Rhythm: Normal rate and regular rhythm.   Pulmonary:      Effort: Pulmonary effort is normal.      Breath sounds: Normal breath sounds.   Abdominal:      General: Bowel sounds are normal.   Musculoskeletal:      Cervical back: Normal range of motion and neck supple.   Skin:     General: Skin is warm.   Neurological:      General: No focal deficit present.      Mental Status: He is alert and oriented to person, place, and time.   Psychiatric:         Mood and Affect: Mood normal.           Assessment/Plan:   Diagnoses and all orders for this visit:    1. Sore throat (Primary)  -     POCT rapid strep A  -     Throat / " Upper Respiratory Culture - Swab, Throat; Future  -     POCT SARS-CoV-2 Antigen KEYSHA  -     POCT Influenza A/B    2. Acute tonsillitis, unspecified etiology  -     cephalexin (Keflex) 500 MG capsule; Take 1 capsule by mouth 2 (Two) Times a Day for 10 days.  Dispense: 20 capsule; Refill: 0  -     Throat / Upper Respiratory Culture - Swab, Throat; Future    3. Viral infection  -     valACYclovir (Valtrex) 1000 MG tablet; Take 1 tablet by mouth 2 (Two) Times a Day for 7 days.  Dispense: 14 tablet; Refill: 0  -     Throat / Upper Respiratory Culture - Swab, Throat; Future  -     POCT SARS-CoV-2 Antigen KEYSHA  -     POCT Influenza A/B    4. Bacterial skin infection  -     mupirocin (BACTROBAN) 2 % ointment; Apply 1 Application topically to the appropriate area as directed 3 (Three) Times a Day.  Dispense: 30 g; Refill: 0       Negative for covid, flu and strep  Throat culture completed today in clinic  Change out toothbrush in 3 days  Increase fluid intake  Take full course of abx  Monitor for worsening symptoms  Tylenol and ibuprofen as needed for aches and fever.  Go to the ER for any shortness of breath, chest pains, fever uncontrolled by medications.      Follow Up:   Return if symptoms worsen or fail to improve.    Kaye Al. ZULMA   [facility]

## 2024-03-16 LAB
BACTERIA SPEC RESP CULT: ABNORMAL
BACTERIA SPEC RESP CULT: ABNORMAL